# Patient Record
Sex: FEMALE | Race: WHITE | Employment: PART TIME | URBAN - METROPOLITAN AREA
[De-identification: names, ages, dates, MRNs, and addresses within clinical notes are randomized per-mention and may not be internally consistent; named-entity substitution may affect disease eponyms.]

---

## 2017-09-08 ENCOUNTER — OFFICE VISIT (OUTPATIENT)
Dept: FAMILY MEDICINE CLINIC | Age: 22
End: 2017-09-08

## 2017-09-08 VITALS
BODY MASS INDEX: 23.27 KG/M2 | DIASTOLIC BLOOD PRESSURE: 60 MMHG | HEART RATE: 83 BPM | TEMPERATURE: 98.3 F | OXYGEN SATURATION: 99 % | SYSTOLIC BLOOD PRESSURE: 102 MMHG | WEIGHT: 150.8 LBS

## 2017-09-08 DIAGNOSIS — F41.9 ANXIETY: ICD-10-CM

## 2017-09-08 DIAGNOSIS — E16.2 HYPOGLYCEMIA: ICD-10-CM

## 2017-09-08 LAB
A/G RATIO: 1.8 (ref 1.1–2.2)
ALBUMIN SERPL-MCNC: 4.4 G/DL (ref 3.4–5)
ALP BLD-CCNC: 51 U/L (ref 40–129)
ALT SERPL-CCNC: 11 U/L (ref 10–40)
ANION GAP SERPL CALCULATED.3IONS-SCNC: 14 MMOL/L (ref 3–16)
AST SERPL-CCNC: 12 U/L (ref 15–37)
BASOPHILS ABSOLUTE: 0.1 K/UL (ref 0–0.2)
BASOPHILS RELATIVE PERCENT: 1.2 %
BILIRUB SERPL-MCNC: 0.6 MG/DL (ref 0–1)
BUN BLDV-MCNC: 10 MG/DL (ref 7–20)
CALCIUM SERPL-MCNC: 8.8 MG/DL (ref 8.3–10.6)
CHLORIDE BLD-SCNC: 101 MMOL/L (ref 99–110)
CO2: 24 MMOL/L (ref 21–32)
CREAT SERPL-MCNC: 0.7 MG/DL (ref 0.6–1.1)
EOSINOPHILS ABSOLUTE: 0.1 K/UL (ref 0–0.6)
EOSINOPHILS RELATIVE PERCENT: 1.4 %
GFR AFRICAN AMERICAN: >60
GFR NON-AFRICAN AMERICAN: >60
GLOBULIN: 2.5 G/DL
GLUCOSE BLD-MCNC: 80 MG/DL (ref 70–99)
HCT VFR BLD CALC: 40.4 % (ref 36–48)
HEMOGLOBIN: 13.5 G/DL (ref 12–16)
LYMPHOCYTES ABSOLUTE: 1.7 K/UL (ref 1–5.1)
LYMPHOCYTES RELATIVE PERCENT: 26.2 %
MCH RBC QN AUTO: 29.8 PG (ref 26–34)
MCHC RBC AUTO-ENTMCNC: 33.5 G/DL (ref 31–36)
MCV RBC AUTO: 89.1 FL (ref 80–100)
MONOCYTES ABSOLUTE: 0.5 K/UL (ref 0–1.3)
MONOCYTES RELATIVE PERCENT: 7.3 %
NEUTROPHILS ABSOLUTE: 4.2 K/UL (ref 1.7–7.7)
NEUTROPHILS RELATIVE PERCENT: 63.9 %
PDW BLD-RTO: 12.4 % (ref 12.4–15.4)
PLATELET # BLD: 257 K/UL (ref 135–450)
PMV BLD AUTO: 8.1 FL (ref 5–10.5)
POTASSIUM SERPL-SCNC: 4.2 MMOL/L (ref 3.5–5.1)
RBC # BLD: 4.53 M/UL (ref 4–5.2)
SODIUM BLD-SCNC: 139 MMOL/L (ref 136–145)
TOTAL PROTEIN: 6.9 G/DL (ref 6.4–8.2)
TSH SERPL DL<=0.05 MIU/L-ACNC: 0.78 UIU/ML (ref 0.27–4.2)
WBC # BLD: 6.6 K/UL (ref 4–11)

## 2017-09-08 PROCEDURE — 99213 OFFICE O/P EST LOW 20 MIN: CPT | Performed by: NURSE PRACTITIONER

## 2017-09-08 RX ORDER — BUSPIRONE HYDROCHLORIDE 7.5 MG/1
7.5 TABLET ORAL 2 TIMES DAILY
Qty: 60 TABLET | Refills: 2 | Status: SHIPPED | OUTPATIENT
Start: 2017-09-08 | End: 2018-12-19

## 2017-09-08 ASSESSMENT — ENCOUNTER SYMPTOMS
GASTROINTESTINAL NEGATIVE: 1
VOMITING: 0
DIARRHEA: 0
TROUBLE SWALLOWING: 0
BLOOD IN STOOL: 0
ABDOMINAL DISTENTION: 0
CHOKING: 0
STRIDOR: 0
VOICE CHANGE: 0
ALLERGIC/IMMUNOLOGIC NEGATIVE: 1
NAUSEA: 0
FACIAL SWELLING: 0
CHEST TIGHTNESS: 0
ABDOMINAL PAIN: 0
COUGH: 0
APNEA: 0
EYES NEGATIVE: 1
COLOR CHANGE: 0
BACK PAIN: 0
WHEEZING: 0
SHORTNESS OF BREATH: 0
CONSTIPATION: 0
ANAL BLEEDING: 0

## 2017-09-09 LAB
ESTIMATED AVERAGE GLUCOSE: 93.9 MG/DL
HBA1C MFR BLD: 4.9 %

## 2017-12-05 ENCOUNTER — OFFICE VISIT (OUTPATIENT)
Dept: FAMILY MEDICINE CLINIC | Age: 22
End: 2017-12-05

## 2017-12-05 VITALS
HEART RATE: 134 BPM | BODY MASS INDEX: 23.02 KG/M2 | WEIGHT: 151.9 LBS | HEIGHT: 68 IN | DIASTOLIC BLOOD PRESSURE: 70 MMHG | OXYGEN SATURATION: 98 % | SYSTOLIC BLOOD PRESSURE: 110 MMHG

## 2017-12-05 DIAGNOSIS — R11.0 NAUSEA: ICD-10-CM

## 2017-12-05 DIAGNOSIS — F41.9 ANXIETY AND DEPRESSION: ICD-10-CM

## 2017-12-05 DIAGNOSIS — G44.219 EPISODIC TENSION-TYPE HEADACHE, NOT INTRACTABLE: ICD-10-CM

## 2017-12-05 DIAGNOSIS — Z00.00 WELL ADULT EXAM: ICD-10-CM

## 2017-12-05 DIAGNOSIS — F32.A ANXIETY AND DEPRESSION: ICD-10-CM

## 2017-12-05 PROCEDURE — 99395 PREV VISIT EST AGE 18-39: CPT | Performed by: FAMILY MEDICINE

## 2017-12-05 RX ORDER — ESCITALOPRAM OXALATE 10 MG/1
10 TABLET ORAL DAILY
Qty: 30 TABLET | Refills: 3 | Status: SHIPPED | OUTPATIENT
Start: 2017-12-05 | End: 2018-01-09 | Stop reason: SDUPTHER

## 2017-12-05 NOTE — PROGRESS NOTES
History and Physical      Judy Kan  YOB: 1995    Date of Service:  12/5/2017    Chief Complaint:   Jduy Kan is a 23 y.o. female who presents for complete physical examination.     HPI: cpx  Multiple issues--depression,anxiety and abd pain with nausea--HA    Wt Readings from Last 3 Encounters:   12/05/17 151 lb 14.4 oz (68.9 kg)   09/08/17 150 lb 12.8 oz (68.4 kg)   07/28/14 137 lb 4.8 oz (62.3 kg)     BP Readings from Last 3 Encounters:   12/05/17 110/70   09/08/17 102/60   07/28/14 110/65       Patient Active Problem List   Diagnosis    Anxiety and depression    Asthma    Seborrheic dermatitis    Well adult exam    Pharyngitis    Tension type headache    Bilateral foot pain    Hypoglycemia    Nausea       Preventive Care:  Health Maintenance   Topic Date Due    HIV screen  07/31/2008    Chlamydia screen  07/31/2009    Pneumococcal med risk (1 of 1 - PPSV23) 07/31/2012    Cervical cancer screen  07/31/2014    Flu vaccine (1) 09/01/2017    DTaP/Tdap/Td vaccine (7 - Td) 06/28/2021      Hx abnormal PAP: no  Sexual activity: none   Self-breast exams: yes  Previous DEXA scan: no  Last eye exam: 2017, normal  Exercise: no regular exercise  Seatbelt use: +  Lipid panel:   Lab Results   Component Value Date    CHOL 154 07/09/2012    TRIG 67 07/09/2012    HDL 51 07/09/2012    LDLCALC 90 07/09/2012        Advance Directive: N, Not Received    Immunization History   Administered Date(s) Administered    DTaP 1993, 1993, 1993, 11/23/1994, 07/07/1998    Hepatitis B, unspecified formulation 1993, 1993, 03/21/1994    Hib, unspecified foumulation 1993, 1993, 03/21/1994, 11/23/1994    IPV (Ipol) 1993, 1993, 03/21/1994, 07/07/1998    MMR 11/23/1994, 07/07/1998    Meningococcal MPSV4 (Menomune) 06/08/2010    Tdap (Boostrix, Adacel) 06/28/2011    Varicella (Varivax) 04/11/1997       Allergies   Allergen Reactions    Buspar [Buspirone] Other (See Comments)     dizziness     Outpatient Prescriptions Marked as Taking for the 12/5/17 encounter (Office Visit) with Ann Leach MD   Medication Sig Dispense Refill    albuterol (PROAIR HFA) 108 (90 BASE) MCG/ACT inhaler Inhale 2 puffs into the lungs every 6 hours as needed. 2 Inhaler 3    loratadine (CLARITIN) 10 MG tablet Take 1 tablet by mouth as needed. 30 tablet 3       Past Medical History:   Diagnosis Date    Allergy     Anxiety     worse with premestrual    Asthma      No past surgical history on file. Family History   Problem Relation Age of Onset    Diabetes Mother     Asthma Mother      Social History     Social History    Marital status: Single     Spouse name: N/A    Number of children: N/A    Years of education: N/A     Occupational History    Not on file. Social History Main Topics    Smoking status: Never Smoker    Smokeless tobacco: Never Used    Alcohol use Not on file    Drug use: Unknown    Sexual activity: Not on file     Other Topics Concern    Not on file     Social History Narrative    No narrative on file       Review of Systems:  A comprehensive review of systems was negative except for what was noted in the HPI. Physical Exam:   Vitals:    12/05/17 1020   BP: 110/70   Pulse: 134   SpO2: 98%   Weight: 151 lb 14.4 oz (68.9 kg)   Height: 5' 7.5\" (1.715 m)     Body mass index is 23.44 kg/m². Constitutional: She is oriented to person, place, and time. She appears well-developed and well-nourished. No distress. HEENT:   Head: Normocephalic and atraumatic. Right Ear: Tympanic membrane, external ear and ear canal normal.   Left Ear: Tympanic membrane, external ear and ear canal normal.   Nose: Nose normal.   Mouth/Throat: Oropharynx is clear and moist, and mucous membranes are normal.  There is no cervical adenopathy. Eyes: Conjunctivae and extraocular motions are normal. Pupils are equal, round, and reactive to light. Neck: Neck supple.  No

## 2018-01-09 ENCOUNTER — OFFICE VISIT (OUTPATIENT)
Dept: FAMILY MEDICINE CLINIC | Age: 23
End: 2018-01-09

## 2018-01-09 VITALS
WEIGHT: 147.3 LBS | HEART RATE: 128 BPM | BODY MASS INDEX: 22.32 KG/M2 | OXYGEN SATURATION: 98 % | DIASTOLIC BLOOD PRESSURE: 80 MMHG | SYSTOLIC BLOOD PRESSURE: 120 MMHG | HEIGHT: 68 IN

## 2018-01-09 DIAGNOSIS — F32.A ANXIETY AND DEPRESSION: ICD-10-CM

## 2018-01-09 DIAGNOSIS — F41.9 ANXIETY AND DEPRESSION: ICD-10-CM

## 2018-01-09 PROCEDURE — G8420 CALC BMI NORM PARAMETERS: HCPCS | Performed by: FAMILY MEDICINE

## 2018-01-09 PROCEDURE — 99213 OFFICE O/P EST LOW 20 MIN: CPT | Performed by: FAMILY MEDICINE

## 2018-01-09 PROCEDURE — G8427 DOCREV CUR MEDS BY ELIG CLIN: HCPCS | Performed by: FAMILY MEDICINE

## 2018-01-09 PROCEDURE — G8484 FLU IMMUNIZE NO ADMIN: HCPCS | Performed by: FAMILY MEDICINE

## 2018-01-09 PROCEDURE — 1036F TOBACCO NON-USER: CPT | Performed by: FAMILY MEDICINE

## 2018-01-09 RX ORDER — ESCITALOPRAM OXALATE 10 MG/1
10 TABLET ORAL DAILY
Qty: 30 TABLET | Refills: 5 | Status: SHIPPED | OUTPATIENT
Start: 2018-01-09 | End: 2018-01-19 | Stop reason: SDUPTHER

## 2018-01-09 ASSESSMENT — PATIENT HEALTH QUESTIONNAIRE - PHQ9
SUM OF ALL RESPONSES TO PHQ9 QUESTIONS 1 & 2: 0
1. LITTLE INTEREST OR PLEASURE IN DOING THINGS: 0
SUM OF ALL RESPONSES TO PHQ QUESTIONS 1-9: 0
2. FEELING DOWN, DEPRESSED OR HOPELESS: 0

## 2018-01-09 ASSESSMENT — ENCOUNTER SYMPTOMS
ABDOMINAL PAIN: 0
VISUAL CHANGE: 0

## 2018-01-19 ENCOUNTER — TELEPHONE (OUTPATIENT)
Dept: FAMILY MEDICINE CLINIC | Age: 23
End: 2018-01-19

## 2018-01-19 RX ORDER — ESCITALOPRAM OXALATE 10 MG/1
20 TABLET ORAL DAILY
Qty: 60 TABLET | Refills: 5 | Status: SHIPPED | OUTPATIENT
Start: 2018-01-19 | End: 2018-03-27

## 2018-01-19 NOTE — TELEPHONE ENCOUNTER
Pt went to therapist today and was told to connect Dr. Zoran Parish about upping her medications.     escitalopram (LEXAPRO) 10 MG tablet    [facility] [address] [facility], [address] [place] [phone]    Please advise  Pedro Pablo Kendall [phone]

## 2018-03-07 ENCOUNTER — TELEPHONE (OUTPATIENT)
Dept: FAMILY MEDICINE CLINIC | Age: 23
End: 2018-03-07

## 2018-03-07 RX ORDER — VENLAFAXINE HYDROCHLORIDE 37.5 MG/1
37.5 CAPSULE, EXTENDED RELEASE ORAL DAILY
Qty: 30 CAPSULE | Refills: 3 | Status: SHIPPED | OUTPATIENT
Start: 2018-03-07 | End: 2018-03-27 | Stop reason: SDUPTHER

## 2018-03-07 NOTE — TELEPHONE ENCOUNTER
New medication sent over to pharmacy per  and  spoke with pt and explained weaning process of medication

## 2018-03-27 RX ORDER — VENLAFAXINE HYDROCHLORIDE 75 MG/1
75 CAPSULE, EXTENDED RELEASE ORAL DAILY
Qty: 30 CAPSULE | Refills: 3 | Status: SHIPPED | OUTPATIENT
Start: 2018-03-27 | End: 2018-07-25 | Stop reason: SDUPTHER

## 2018-04-09 ENCOUNTER — OFFICE VISIT (OUTPATIENT)
Dept: FAMILY MEDICINE CLINIC | Age: 23
End: 2018-04-09

## 2018-04-09 VITALS
HEART RATE: 122 BPM | OXYGEN SATURATION: 98 % | BODY MASS INDEX: 22.32 KG/M2 | SYSTOLIC BLOOD PRESSURE: 100 MMHG | WEIGHT: 147.3 LBS | DIASTOLIC BLOOD PRESSURE: 70 MMHG | HEIGHT: 68 IN

## 2018-04-09 DIAGNOSIS — F41.9 ANXIETY AND DEPRESSION: ICD-10-CM

## 2018-04-09 DIAGNOSIS — F32.A ANXIETY AND DEPRESSION: ICD-10-CM

## 2018-04-09 PROCEDURE — G8420 CALC BMI NORM PARAMETERS: HCPCS | Performed by: FAMILY MEDICINE

## 2018-04-09 PROCEDURE — G8427 DOCREV CUR MEDS BY ELIG CLIN: HCPCS | Performed by: FAMILY MEDICINE

## 2018-04-09 PROCEDURE — 99213 OFFICE O/P EST LOW 20 MIN: CPT | Performed by: FAMILY MEDICINE

## 2018-04-09 PROCEDURE — 1036F TOBACCO NON-USER: CPT | Performed by: FAMILY MEDICINE

## 2018-04-09 ASSESSMENT — ENCOUNTER SYMPTOMS
ABDOMINAL PAIN: 0
VISUAL CHANGE: 0

## 2018-08-20 ENCOUNTER — TELEPHONE (OUTPATIENT)
Dept: FAMILY MEDICINE CLINIC | Age: 23
End: 2018-08-20

## 2018-08-20 RX ORDER — VENLAFAXINE HYDROCHLORIDE 75 MG/1
75 CAPSULE, EXTENDED RELEASE ORAL 2 TIMES DAILY
Qty: 60 CAPSULE | Refills: 3 | Status: SHIPPED | OUTPATIENT
Start: 2018-08-20 | End: 2018-12-18 | Stop reason: SDUPTHER

## 2018-08-20 NOTE — TELEPHONE ENCOUNTER
[address] says her medication isn't working any more. venlafaxine (EFFEXOR XR) 75 MG extended release capsule  TAKE 1 CAPSULE BY MOUTH DAILY, Disp-30 capsule, R-3    Says she feels no motivation, is sleeping all the time,  She has been taking this medication since March    Wants to either be seen or have us call in script for a different medication.     Last seen 4/9/2018    Please advise patient

## 2018-12-18 RX ORDER — VENLAFAXINE HYDROCHLORIDE 75 MG/1
CAPSULE, EXTENDED RELEASE ORAL
Qty: 60 CAPSULE | Refills: 5 | Status: SHIPPED | OUTPATIENT
Start: 2018-12-18 | End: 2019-10-02 | Stop reason: SDUPTHER

## 2018-12-19 ENCOUNTER — OFFICE VISIT (OUTPATIENT)
Dept: DERMATOLOGY | Age: 23
End: 2018-12-19
Payer: COMMERCIAL

## 2018-12-19 DIAGNOSIS — D22.9 BENIGN NEVUS: ICD-10-CM

## 2018-12-19 DIAGNOSIS — L40.9 PSORIASIS: Primary | ICD-10-CM

## 2018-12-19 PROCEDURE — G8427 DOCREV CUR MEDS BY ELIG CLIN: HCPCS | Performed by: DERMATOLOGY

## 2018-12-19 PROCEDURE — G8484 FLU IMMUNIZE NO ADMIN: HCPCS | Performed by: DERMATOLOGY

## 2018-12-19 PROCEDURE — 1036F TOBACCO NON-USER: CPT | Performed by: DERMATOLOGY

## 2018-12-19 PROCEDURE — 99213 OFFICE O/P EST LOW 20 MIN: CPT | Performed by: DERMATOLOGY

## 2018-12-19 PROCEDURE — G8420 CALC BMI NORM PARAMETERS: HCPCS | Performed by: DERMATOLOGY

## 2018-12-19 RX ORDER — PIMECROLIMUS 10 MG/G
CREAM TOPICAL
Qty: 30 G | Refills: 4 | Status: SHIPPED | OUTPATIENT
Start: 2018-12-19

## 2018-12-19 RX ORDER — KETOCONAZOLE 20 MG/ML
SHAMPOO TOPICAL
Qty: 120 ML | Refills: 5 | Status: SHIPPED | OUTPATIENT
Start: 2018-12-19

## 2018-12-19 RX ORDER — FLUOCINONIDE TOPICAL SOLUTION USP, 0.05% 0.5 MG/ML
SOLUTION TOPICAL
Qty: 60 ML | Refills: 5 | Status: SHIPPED | OUTPATIENT
Start: 2018-12-19

## 2018-12-19 NOTE — PROGRESS NOTES
[facility]) Dermatology  Lane Rivera M.D.  [phone]       Danuta Iniguez  1995    20 y.o. female     Date of Visit: 12/19/2018    Chief Complaint:   Chief Complaint   Patient presents with    New Patient     Family hx of skin cancer    Skin Lesion        I was asked to see this patient by Dr. Douglas ref. provider found. History of Present Illness:  1. Total body skin exam    Mother and maternal grandfather with a history of skin cancer-unclear what type. No personal history of skin cancer or dysplastic nevi. Does practice sun avoidance. Never a tanning bed user. Patient does have a history of psoriasis. Has tried over-the-counter antidandruff shampoo including most recently at tar shampoo with minimal improvement. Mostly has involvement of her scalp but also her eyebrows and sometimes paranasal cheeks. Multiple nevi-stable in size, shape, color. Has not noted any new or changing pigmented lesions      Review of Systems:  Constitutional: Reports general sense of well-being       Past Medical History, Surgical History, Family History, Medications and Allergies reviewed. Social History:   Social History     Social History    Marital status: Single     Spouse name: N/A    Number of children: N/A    Years of education: N/A     Occupational History    Not on file. Social History Main Topics    Smoking status: Never Smoker    Smokeless tobacco: Never Used    Alcohol use No    Drug use: No    Sexual activity: Not on file     Other Topics Concern    Not on file     Social History Narrative    No narrative on file       Physical Examination       -General: Well-appearing, NAD  1. Normal affect. Total body skin exam including scalp, face, neck, chest, abdomen, back, bilateral upper extremities, bilateral lower extremities, ocular conjunctiva, external lips, and nails was performed. Examination normal unless stated below. Underwear area not examined.   Scattered on the trunk and extremities are multiple well-defined round and oval symmetric smoothly-bordered uniformly brown macules and papules. Scale throughout her scalp, eyebrows. Mild erythema paranasal cheeks with scale      Assessment and Plan     1. Psoriasis / sebopsoriasis-Nizoral 2% shampoo every other day or every day as tolerated, Elidel 1% cream b.i.d. to her eyebrows and paranasal cheeks, fluocinonide solution b.i.d. p.r.n. scalp. Reviewed proper use and side effects    2.  Benign nevus - Benign acquired melanocytic nevi  -Recommend monthly self skin exams   -Educated regarding the ABCDEs of melanoma detection   -Call for any new/changing moles or concerning lesions  -Reviewed sun protective behavior -- sun avoidance during the peak hours of the day, sun-protective clothing (including hat and sunglasses), sunscreen use (water resistant, broad spectrum, SPF at least 30, need for reapplication every 2 to 3 hours), avoidance of tanning beds

## 2019-03-12 ENCOUNTER — OFFICE VISIT (OUTPATIENT)
Dept: GYNECOLOGY | Age: 24
End: 2019-03-12
Payer: COMMERCIAL

## 2019-03-12 VITALS
BODY MASS INDEX: 21.67 KG/M2 | DIASTOLIC BLOOD PRESSURE: 71 MMHG | WEIGHT: 143 LBS | HEART RATE: 92 BPM | HEIGHT: 68 IN | SYSTOLIC BLOOD PRESSURE: 113 MMHG

## 2019-03-12 DIAGNOSIS — L68.9 EXCESSIVE HAIR GROWTH: ICD-10-CM

## 2019-03-12 DIAGNOSIS — Z01.419 WELL WOMAN EXAM WITH ROUTINE GYNECOLOGICAL EXAM: ICD-10-CM

## 2019-03-12 DIAGNOSIS — Z30.011 ENCOUNTER FOR INITIAL PRESCRIPTION OF CONTRACEPTIVE PILLS: ICD-10-CM

## 2019-03-12 DIAGNOSIS — N94.6 DYSMENORRHEA: Primary | ICD-10-CM

## 2019-03-12 PROCEDURE — G8484 FLU IMMUNIZE NO ADMIN: HCPCS | Performed by: OBSTETRICS & GYNECOLOGY

## 2019-03-12 PROCEDURE — 99385 PREV VISIT NEW AGE 18-39: CPT | Performed by: OBSTETRICS & GYNECOLOGY

## 2019-03-12 RX ORDER — IBUPROFEN 200 MG
TABLET ORAL
COMMUNITY

## 2019-03-12 ASSESSMENT — ENCOUNTER SYMPTOMS
RECTAL PAIN: 0
SHORTNESS OF BREATH: 0
ABDOMINAL DISTENTION: 0
ABDOMINAL PAIN: 0
COUGH: 0
TROUBLE SWALLOWING: 0
PHOTOPHOBIA: 0
VOMITING: 0
SORE THROAT: 0
BLOOD IN STOOL: 0
WHEEZING: 0
APNEA: 0
CHEST TIGHTNESS: 0
ANAL BLEEDING: 0
BACK PAIN: 0
CONSTIPATION: 0
COLOR CHANGE: 0
DIARRHEA: 0
NAUSEA: 0

## 2019-03-14 ENCOUNTER — TELEPHONE (OUTPATIENT)
Dept: GYNECOLOGY | Age: 24
End: 2019-03-14

## 2019-03-15 ENCOUNTER — HOSPITAL ENCOUNTER (OUTPATIENT)
Dept: ULTRASOUND IMAGING | Age: 24
Discharge: HOME | End: 2019-03-15
Payer: COMMERCIAL

## 2019-03-15 DIAGNOSIS — N94.6 DYSMENORRHEA: ICD-10-CM

## 2019-03-15 PROCEDURE — 76856 US EXAM PELVIC COMPLETE: CPT

## 2019-05-17 ENCOUNTER — TELEPHONE (OUTPATIENT)
Dept: FAMILY MEDICINE CLINIC | Age: 24
End: 2019-05-17

## 2019-10-02 ENCOUNTER — TELEPHONE (OUTPATIENT)
Dept: FAMILY MEDICINE CLINIC | Age: 24
End: 2019-10-02

## 2019-10-02 RX ORDER — VENLAFAXINE HYDROCHLORIDE 75 MG/1
CAPSULE, EXTENDED RELEASE ORAL
Qty: 60 CAPSULE | Refills: 5 | Status: SHIPPED | OUTPATIENT
Start: 2019-10-02 | End: 2020-10-20 | Stop reason: SDUPTHER

## 2020-10-20 RX ORDER — VENLAFAXINE HYDROCHLORIDE 75 MG/1
CAPSULE, EXTENDED RELEASE ORAL
Qty: 60 CAPSULE | Refills: 5 | Status: SHIPPED | OUTPATIENT
Start: 2020-10-20